# Patient Record
Sex: FEMALE | Race: WHITE | NOT HISPANIC OR LATINO | URBAN - METROPOLITAN AREA
[De-identification: names, ages, dates, MRNs, and addresses within clinical notes are randomized per-mention and may not be internally consistent; named-entity substitution may affect disease eponyms.]

---

## 2022-06-09 ENCOUNTER — ESTABLISHED PATIENT (OUTPATIENT)
Dept: URBAN - METROPOLITAN AREA CLINIC 21 | Facility: CLINIC | Age: 62
End: 2022-06-09

## 2022-06-09 DIAGNOSIS — H25.813: ICD-10-CM

## 2022-06-09 DIAGNOSIS — H52.4: ICD-10-CM

## 2022-06-09 PROCEDURE — 92014 COMPRE OPH EXAM EST PT 1/>: CPT

## 2022-06-09 ASSESSMENT — TONOMETRY
OS_IOP_MMHG: 15
OD_IOP_MMHG: 15

## 2022-06-09 ASSESSMENT — KERATOMETRY
OD_K2POWER_DIOPTERS: 42.50
OS_AXISANGLE_DEGREES: 114
OD_K1POWER_DIOPTERS: 42.50
OS_K2POWER_DIOPTERS: 42.50
OD_AXISANGLE2_DEGREES: 90
OS_AXISANGLE2_DEGREES: 24
OD_AXISANGLE_DEGREES: 0
OS_K1POWER_DIOPTERS: 42.00

## 2022-06-09 ASSESSMENT — VISUAL ACUITY
OU_CC: J1+
OS_CC: 20/20
OD_CC: 20/20

## 2023-01-16 NOTE — PATIENT DISCUSSION
DIABETES WITHOUT RETINOPATHY: PATIENT INSTRUCTED TO RETURN TO PCP FOR CONTINUED BLOOD  SUGAR MONITORING AND RETURN FOR FOLLOW-UP AS SCHEDULED FOR DILATED EXAM.

## 2023-01-16 NOTE — PATIENT DISCUSSION
Visually significant PCO present on exam today. Cannot improve vision with refraction/glasses at this time. Discussed treatment options including observation versus Yag capsulotomy. Laser recommended to improve vision. We discussed the risks/benefits of laser capsulotomy. All questions were answered. Patient elects to proceed. Schedule Yag capsulotomy OS then OD.

## 2023-10-11 ENCOUNTER — ESTABLISHED PATIENT (OUTPATIENT)
Dept: URBAN - METROPOLITAN AREA CLINIC 21 | Facility: CLINIC | Age: 63
End: 2023-10-11

## 2023-10-11 DIAGNOSIS — H52.4: ICD-10-CM

## 2023-10-11 DIAGNOSIS — H02.836: ICD-10-CM

## 2023-10-11 DIAGNOSIS — H25.813: ICD-10-CM

## 2023-10-11 DIAGNOSIS — H02.833: ICD-10-CM

## 2023-10-11 PROCEDURE — 92014 COMPRE OPH EXAM EST PT 1/>: CPT

## 2023-10-11 ASSESSMENT — KERATOMETRY
OD_AXISANGLE_DEGREES: 104
OS_AXISANGLE_DEGREES: 0
OS_K2POWER_DIOPTERS: 42.75
OD_AXISANGLE2_DEGREES: 14
OS_AXISANGLE2_DEGREES: 90
OD_K1POWER_DIOPTERS: 41.75
OS_K1POWER_DIOPTERS: 42.75
OD_K2POWER_DIOPTERS: 42.50

## 2023-10-11 ASSESSMENT — VISUAL ACUITY
OU_SC: J7
OS_SC: 20/50-1
OD_SC: 20/20

## 2023-10-11 ASSESSMENT — TONOMETRY
OD_IOP_MMHG: 17
OS_IOP_MMHG: 16